# Patient Record
Sex: MALE | Race: WHITE
[De-identification: names, ages, dates, MRNs, and addresses within clinical notes are randomized per-mention and may not be internally consistent; named-entity substitution may affect disease eponyms.]

---

## 2017-03-03 ENCOUNTER — HOSPITAL ENCOUNTER (OUTPATIENT)
Age: 62
End: 2017-03-03
Payer: COMMERCIAL

## 2017-03-03 DIAGNOSIS — I10: ICD-10-CM

## 2017-03-03 DIAGNOSIS — E78.2: ICD-10-CM

## 2017-03-03 DIAGNOSIS — E66.3: ICD-10-CM

## 2017-03-03 DIAGNOSIS — E11.65: Primary | ICD-10-CM

## 2017-03-03 DIAGNOSIS — R35.1: ICD-10-CM

## 2017-03-03 LAB — BUN/CREATININE RATIO: 43 (ref 0–10)

## 2017-03-27 ENCOUNTER — HOSPITAL ENCOUNTER (INPATIENT)
Dept: HOSPITAL 79 - ER1 | Age: 62
LOS: 3 days | Discharge: HOME | DRG: 683 | End: 2017-03-30
Attending: CLINIC/CENTER | Admitting: CLINIC/CENTER
Payer: COMMERCIAL

## 2017-03-27 DIAGNOSIS — Z79.4: ICD-10-CM

## 2017-03-27 DIAGNOSIS — Z28.21: ICD-10-CM

## 2017-03-27 DIAGNOSIS — E78.5: ICD-10-CM

## 2017-03-27 DIAGNOSIS — B34.9: ICD-10-CM

## 2017-03-27 DIAGNOSIS — Z79.899: ICD-10-CM

## 2017-03-27 DIAGNOSIS — R07.89: ICD-10-CM

## 2017-03-27 DIAGNOSIS — Z87.442: ICD-10-CM

## 2017-03-27 DIAGNOSIS — R65.10: ICD-10-CM

## 2017-03-27 DIAGNOSIS — E87.2: ICD-10-CM

## 2017-03-27 DIAGNOSIS — Z82.49: ICD-10-CM

## 2017-03-27 DIAGNOSIS — Z79.82: ICD-10-CM

## 2017-03-27 DIAGNOSIS — R13.10: ICD-10-CM

## 2017-03-27 DIAGNOSIS — M51.36: ICD-10-CM

## 2017-03-27 DIAGNOSIS — I10: ICD-10-CM

## 2017-03-27 DIAGNOSIS — E86.0: ICD-10-CM

## 2017-03-27 DIAGNOSIS — K57.90: ICD-10-CM

## 2017-03-27 DIAGNOSIS — E11.65: ICD-10-CM

## 2017-03-27 DIAGNOSIS — Z84.89: ICD-10-CM

## 2017-03-27 DIAGNOSIS — N17.9: Primary | ICD-10-CM

## 2017-03-27 DIAGNOSIS — K52.9: ICD-10-CM

## 2017-03-27 LAB
BUN/CREATININE RATIO: 30 (ref 0–10)
HGB BLD-MCNC: 17.2 GM/DL (ref 14–17.5)
RED BLOOD COUNT: 5.56 M/UL (ref 4.2–5.5)
WHITE BLOOD COUNT: 17.3 K/UL (ref 4.5–11)

## 2017-03-27 PROCEDURE — C9113 INJ PANTOPRAZOLE SODIUM, VIA: HCPCS

## 2017-03-28 LAB
BUN/CREATININE RATIO: 35 (ref 0–10)
HGB BLD-MCNC: 16 GM/DL (ref 14–17.5)
RED BLOOD COUNT: 5.22 M/UL (ref 4.2–5.5)
WHITE BLOOD COUNT: 16.2 K/UL (ref 4.5–11)

## 2017-03-29 LAB
BUN/CREATININE RATIO: 33 (ref 0–10)
HGB BLD-MCNC: 15.5 GM/DL (ref 14–17.5)
RED BLOOD COUNT: 5.18 M/UL (ref 4.2–5.5)
WHITE BLOOD COUNT: 10.2 K/UL (ref 4.5–11)

## 2017-03-30 LAB
BUN/CREATININE RATIO: 34 (ref 0–10)
HGB BLD-MCNC: 15.3 GM/DL (ref 14–17.5)
RED BLOOD COUNT: 5.01 M/UL (ref 4.2–5.5)
WHITE BLOOD COUNT: 8.1 K/UL (ref 4.5–11)

## 2017-04-05 ENCOUNTER — HOSPITAL ENCOUNTER (OUTPATIENT)
Dept: HOSPITAL 79 - LAB | Age: 62
End: 2017-04-05
Attending: CLINIC/CENTER
Payer: COMMERCIAL

## 2017-04-05 DIAGNOSIS — N17.9: Primary | ICD-10-CM

## 2017-04-05 LAB — BUN/CREATININE RATIO: 33 (ref 0–10)

## 2017-04-10 ENCOUNTER — HOSPITAL ENCOUNTER (OUTPATIENT)
Dept: HOSPITAL 79 - LAB | Age: 62
End: 2017-04-10
Attending: CLINIC/CENTER
Payer: COMMERCIAL

## 2017-04-10 DIAGNOSIS — E04.1: ICD-10-CM

## 2017-04-10 DIAGNOSIS — N17.8: Primary | ICD-10-CM

## 2017-04-10 DIAGNOSIS — E86.0: ICD-10-CM

## 2017-05-02 ENCOUNTER — HOSPITAL ENCOUNTER (OUTPATIENT)
Dept: HOSPITAL 79 - LAB | Age: 62
End: 2017-05-02
Attending: CLINIC/CENTER
Payer: COMMERCIAL

## 2017-05-02 DIAGNOSIS — E04.1: ICD-10-CM

## 2017-05-02 DIAGNOSIS — N17.8: Primary | ICD-10-CM

## 2017-05-02 DIAGNOSIS — E78.2: ICD-10-CM

## 2017-05-02 DIAGNOSIS — E11.69: ICD-10-CM

## 2017-05-02 DIAGNOSIS — I10: ICD-10-CM

## 2017-05-02 LAB
HGB BLD-MCNC: 15.2 GM/DL (ref 14–17.5)
RED BLOOD COUNT: 5.05 M/UL (ref 4.2–5.5)
WHITE BLOOD COUNT: 5.9 K/UL (ref 4.5–11)

## 2017-05-23 ENCOUNTER — HOSPITAL ENCOUNTER (OUTPATIENT)
Dept: HOSPITAL 79 - US | Age: 62
End: 2017-05-23
Attending: CLINIC/CENTER
Payer: COMMERCIAL

## 2017-05-23 DIAGNOSIS — E04.1: Primary | ICD-10-CM

## 2024-09-24 ENCOUNTER — TELEPHONE (OUTPATIENT)
Age: 69
End: 2024-09-24

## 2024-09-24 NOTE — TELEPHONE ENCOUNTER
"HUB - OK to schedule \"     Your appointment with Pilo Graham PA-C on 10/22/2024 has been canceled as Pilo Graham will be out of the office that day. We have been unable to contact you to get this appointment rescheduled. Please call [phone] and we will get your appointment rescheduled at your convenience. Thank you and have a great day.   "

## 2025-01-16 ENCOUNTER — OFFICE VISIT (OUTPATIENT)
Dept: UROLOGY | Facility: CLINIC | Age: 70
End: 2025-01-16
Payer: MEDICARE

## 2025-01-16 VITALS — WEIGHT: 190 LBS | HEIGHT: 68 IN | BODY MASS INDEX: 28.79 KG/M2

## 2025-01-16 DIAGNOSIS — N52.9 ERECTILE DYSFUNCTION, UNSPECIFIED ERECTILE DYSFUNCTION TYPE: Primary | ICD-10-CM

## 2025-01-16 DIAGNOSIS — R39.9 LOWER URINARY TRACT SYMPTOMS: ICD-10-CM

## 2025-01-16 RX ORDER — EZETIMIBE AND SIMVASTATIN 10; 20 MG/1; MG/1
1 TABLET ORAL NIGHTLY
COMMUNITY
Start: 2024-12-10

## 2025-01-16 RX ORDER — AMLODIPINE BESYLATE 5 MG/1
10 TABLET ORAL DAILY
COMMUNITY
Start: 2024-09-11

## 2025-01-16 RX ORDER — INSULIN GLARGINE 100 [IU]/ML
INJECTION, SOLUTION SUBCUTANEOUS DAILY
COMMUNITY
Start: 2024-12-02

## 2025-01-16 RX ORDER — TADALAFIL 20 MG/1
20 TABLET ORAL DAILY PRN
Qty: 30 TABLET | Refills: 3 | Status: SHIPPED | OUTPATIENT
Start: 2025-01-16

## 2025-01-16 RX ORDER — LISINOPRIL 40 MG/1
40 TABLET ORAL DAILY
COMMUNITY
Start: 2024-11-22

## 2025-01-16 RX ORDER — METHOCARBAMOL 500 MG/1
500 TABLET, FILM COATED ORAL 3 TIMES DAILY
COMMUNITY
Start: 2024-12-04

## 2025-01-16 RX ORDER — CARBIDOPA AND LEVODOPA 25; 100 MG/1; MG/1
1 TABLET ORAL 4 TIMES DAILY
COMMUNITY
Start: 2024-12-12

## 2025-01-16 RX ORDER — LEVOTHYROXINE SODIUM 137 UG/1
137 TABLET ORAL DAILY
COMMUNITY

## 2025-01-16 RX ORDER — INSULIN ASPART 100 [IU]/ML
INJECTION, SOLUTION INTRAVENOUS; SUBCUTANEOUS
COMMUNITY
Start: 2024-09-26

## 2025-01-16 NOTE — PROGRESS NOTES
Erectile Dysfunction Office Visit      Patient Name: Ian Lane  : 1955   MRN: [id number]     Chief Complaint:  Erectile Dysfunction.   Chief Complaint   Patient presents with    Erectile Dysfunction    Nocturia    Urinary Urgency       Referring Provider: Humberto Alvarado APRN    History of Present Illness: Ian Lane is a 69 y.o. male with history of Type 2 Diabetes and Parkinson's Disease who presents today with history of erectile dysfunction and lower urinary tract symptoms.  He reports urinary frequency every 3 hours and nocturia x 3.  He reports moderate to strong urinary stream.  He denies urinary straining.  Never tried a medication for lower urinary tract symptoms.  He reports worsening erectile dysfunction over the last 5 years.  He has previously failed max dose of Viagra 100 mg and reports he has even taken 150 mg of Viagra at home without ability to obtain or maintain erections.  He does have type 2 diabetes and his last hemoglobin A1c was 7.5.  He is a non-smoker.    PSA 10/28/24; 2.3.   IPSS 8.   Subjective      Review of System: Review of Systems   Genitourinary:  Positive for frequency.        Nocturia. Erectile dysfunction.    All other systems reviewed and are negative.     I have reviewed the ROS documented by my clinical staff, I have updated appropriately and I agree. JORDAN Dallas    Past Medical History:   Past Medical History:   Diagnosis Date    Diabetes mellitus        Past Surgical History:   Past Surgical History:   Procedure Laterality Date    BACK SURGERY  1982    RETINAL DETACHMENT SURGERY      THYROIDECTOMY         Family History: History reviewed. No pertinent family history.    Social History:   Social History     Socioeconomic History    Marital status:    Tobacco Use    Smoking status: Never    Smokeless tobacco: Never   Vaping Use    Vaping status: Never Used   Substance and Sexual Activity    Alcohol use: Yes     Comment:  occasionally    Drug use: Never    Sexual activity: Yes     Partners: Female       Medications:     Current Outpatient Medications:     amLODIPine (NORVASC) 5 MG tablet, Take 2 tablets by mouth Daily., Disp: , Rfl:     carbidopa-levodopa (SINEMET)  MG per tablet, Take 1 tablet by mouth 4 (Four) Times a Day., Disp: , Rfl:     ezetimibe-simvastatin (VYTORIN) 10-20 MG per tablet, Take 1 tablet by mouth Every Night., Disp: , Rfl:     Lantus SoloStar 100 UNIT/ML injection pen, Inject  under the skin into the appropriate area as directed Daily., Disp: , Rfl:     levothyroxine (SYNTHROID, LEVOTHROID) 137 MCG tablet, Take 1 tablet by mouth Daily., Disp: , Rfl:     lisinopril (PRINIVIL,ZESTRIL) 40 MG tablet, Take 1 tablet by mouth Daily., Disp: , Rfl:     methocarbamol (ROBAXIN) 500 MG tablet, Take 1 tablet by mouth 3 (Three) Times a Day., Disp: , Rfl:     NovoLOG FlexPen 100 UNIT/ML solution pen-injector sc pen, Inject  under the skin into the appropriate area as directed 3 (Three) Times a Day With Meals., Disp: , Rfl:     tadalafil (Cialis) 20 MG tablet, Take 1 tablet by mouth Daily As Needed for Erectile Dysfunction., Disp: 30 tablet, Rfl: 3    Allergies:   No Known Allergies    IPSS Questionnaire (AUA-7):  Over the past month…    1)  Incomplete Emptying:       How often have you had a sensation of not emptying you had the sensation of not emptying your bladder completely after you finished urinating?  1 - Less than 1 time in 5   2)  Frequency:       How often have you had the urinate again less than two hours after you finished urinating?  2 - Less than half the time   3)  Intermittency:       How often have you found you stopped and started again several times when you urinated?   0 - Not at all   4) Urgency:      How often have you found it difficult to postpone urination?  1 - Less than 1 time in 5   5) Weak Stream:      How often have you had a weak urinary stream?  1 - Less than 1 time in 5   6) Straining:      "  How often have you had to push or strain to begin urination?  0 - Not at all   7) Nocturia:      How many times did you most typically get up to urinate from the time you went to bed at night until the time you got up in the morning?  3 - 3 times   Total Score:  8   The International Prostate Symptom Score (IPSS) is used to screen, diagnose, track symptoms of benign prostatic hyperplasia (BPH).   0-7 (Mild Symptoms) 8-19 (Moderate) 20-35 (Severe)   Quality of Life (QoL):  If you were to spend the rest of your life with your urinary condition just the way it is now, how would you feel about that? 2-Mostly Satisfied   Urine Leakage (Incontinence) 0-No Leakage         Objective     Physical Exam:   Vital Signs:   Vitals:    01/16/25 1005   Weight: 86.2 kg (190 lb)   Height: 172.7 cm (68\")     Body mass index is 28.89 kg/m².     Physical Exam  Vitals and nursing note reviewed.   Constitutional:       Appearance: Normal appearance.   HENT:      Head: Normocephalic and atraumatic.      Nose: Nose normal.      Mouth/Throat:      Mouth: Mucous membranes are moist.   Eyes:      Pupils: Pupils are equal, round, and reactive to light.   Pulmonary:      Effort: Pulmonary effort is normal.   Abdominal:      General: Abdomen is flat.      Palpations: Abdomen is soft.   Musculoskeletal:         General: Normal range of motion.      Cervical back: Normal range of motion.   Skin:     General: Skin is warm and dry.      Capillary Refill: Capillary refill takes less than 2 seconds.   Neurological:      General: No focal deficit present.      Mental Status: He is alert.   Psychiatric:         Mood and Affect: Mood normal.       Labs:   No results found for: \"TESTOSTERONE\", \"PROLACTIN\", \"FSH\", \"LH\", \"HCT\"    No results found for: \"PSA\"    Images:   No Images in the past 120 days found..    Measures:   Tobacco:   Ian Lane  reports that he has never smoked. He has never used smokeless tobacco.         Urine Incontinence: Patient " reports that he is not currently experiencing any symptoms of urinary incontinence.    Assessment / Plan      Assessment/Plan:   Mr. Lane is a 69 y.o. male with erectile dysfunction and lower urinary tract symptoms.     We discussed proceeding with intracavernosal injections for erectile dysfunction.  He reports he would like to try Cialis first.  Prescription sent for Cialis 20 mg as needed for erectile dysfunction.  He will consider intracavernosal injections in the future if he fails Cialis.  We also discussed lower urinary tract symptoms and trial of beta 3 agonist versus anatomical evaluation.  He reports he is not overly bothered by his urinary symptoms but he might consider a further workup or medication trial in the future.  We discussed that his lower urinary tract symptoms are likely worsened by his Parkinson's disease.  He will follow-up in 3 months for symptom check.    Diagnoses and all orders for this visit:    1. Erectile dysfunction, unspecified erectile dysfunction type (Primary)  -     tadalafil (Cialis) 20 MG tablet; Take 1 tablet by mouth Daily As Needed for Erectile Dysfunction.  Dispense: 30 tablet; Refill: 3    2. Lower urinary tract symptoms         Patient Education:   1.   I discussed treatment options including oral PDE5- medication,  pharmacologic injections, vacuum erection devices and implantable penile prostheses.    2. The patient was educated about Cialis.  He was counseled on appropriate use, timing and the need for sexual stimulation.  In addition, he understands not to use this medication with nitrates. He was instructed to take the medication about 1 hour prior to sexual activity.  Side effects were explained to the patient such as headache, visual changes, upset stomach, and back pain. Lastly, he was instructed to go immediately to his nearest emergency room in the event he developed an erection lasting longer than 3 hours. He voiced understanding of all these instructions and  the importance of seeking prompt medical attention for an erection lasting longer than 3 hours.     Follow Up:   Return in about 3 months (around 4/16/2025).    I spent approximately 20 minutes providing clinical care for this patient; including review of patient's chart and provider documentation, face to face time spent with patient in examination room (obtaining history, performing physical exam, discussing diagnosis and management options), placing orders, and completing patient documentation.     JORDAN Kelly   [facility] [place]

## 2025-04-16 ENCOUNTER — OFFICE VISIT (OUTPATIENT)
Dept: UROLOGY | Facility: CLINIC | Age: 70
End: 2025-04-16
Payer: MEDICARE

## 2025-04-16 VITALS — SYSTOLIC BLOOD PRESSURE: 160 MMHG | OXYGEN SATURATION: 97 % | DIASTOLIC BLOOD PRESSURE: 86 MMHG | HEART RATE: 68 BPM

## 2025-04-16 DIAGNOSIS — R39.9 LOWER URINARY TRACT SYMPTOMS: ICD-10-CM

## 2025-04-16 DIAGNOSIS — N52.9 ERECTILE DYSFUNCTION, UNSPECIFIED ERECTILE DYSFUNCTION TYPE: Primary | ICD-10-CM

## 2025-04-16 PROCEDURE — 1159F MED LIST DOCD IN RCRD: CPT | Performed by: NURSE PRACTITIONER

## 2025-04-16 PROCEDURE — 1160F RVW MEDS BY RX/DR IN RCRD: CPT | Performed by: NURSE PRACTITIONER

## 2025-04-16 PROCEDURE — 99213 OFFICE O/P EST LOW 20 MIN: CPT | Performed by: NURSE PRACTITIONER

## 2025-04-16 NOTE — PROGRESS NOTES
Erectile Dysfunction Office Visit      Patient Name: Ian Lane  : 1955   MRN: [id number]     Chief Complaint:  Erectile Dysfunction.   Chief Complaint   Patient presents with    Erectile Dysfunction    LUTS       Referring Provider: No ref. provider found    History of Present Illness: Ian Lane is a 69 y.o. male who presents today with history of erectile dysfunction and lower urinary tract symptoms.     He reports occasional urinary urgency.  Is not overly bothered by his urinary symptoms.  IPSS 6.     Previously failed Viagra and has been trying to take Cialis 20 mg as needed for erectile dysfunction.  He reports he is able to obtain a partial erection several hours after taking Cialis.  Subjective      Review of System: Review of Systems   Genitourinary:  Positive for urgency.        Erectile dysfunction   All other systems reviewed and are negative.     I have reviewed the ROS documented by my clinical staff, I have updated appropriately and I agree. JORDAN Dallas    Past Medical History:   Past Medical History:   Diagnosis Date    Diabetes mellitus     Parkinson's disease     Thyroid cancer        Past Surgical History:   Past Surgical History:   Procedure Laterality Date    BACK SURGERY      RETINAL DETACHMENT SURGERY      THYROIDECTOMY         Family History:   Family History   Problem Relation Age of Onset    Prostate cancer Neg Hx     Testicular cancer Neg Hx     Kidney cancer Neg Hx         bladder cancer       Social History:   Social History     Socioeconomic History    Marital status:    Tobacco Use    Smoking status: Never    Smokeless tobacco: Never   Vaping Use    Vaping status: Never Used   Substance and Sexual Activity    Alcohol use: Yes     Comment: occasionally    Drug use: Never    Sexual activity: Yes     Partners: Female       Medications:     Current Outpatient Medications:     amLODIPine (NORVASC) 5 MG tablet, Take 2 tablets by mouth  Daily., Disp: , Rfl:     carbidopa-levodopa (SINEMET)  MG per tablet, Take 1 tablet by mouth 4 (Four) Times a Day., Disp: , Rfl:     ezetimibe-simvastatin (VYTORIN) 10-20 MG per tablet, Take 1 tablet by mouth Every Night., Disp: , Rfl:     Insulin Pen Needle 31G X 8 MM misc, use 1 unit with insulin pens for 90 days E11.65, Disp: , Rfl:     Lantus SoloStar 100 UNIT/ML injection pen, Inject  under the skin into the appropriate area as directed Daily., Disp: , Rfl:     levothyroxine (SYNTHROID, LEVOTHROID) 137 MCG tablet, Take 1 tablet by mouth Daily., Disp: , Rfl:     lisinopril (PRINIVIL,ZESTRIL) 40 MG tablet, Take 1 tablet by mouth Daily., Disp: , Rfl:     methocarbamol (ROBAXIN) 500 MG tablet, Take 1 tablet by mouth 3 (Three) Times a Day., Disp: , Rfl:     NovoLOG FlexPen 100 UNIT/ML solution pen-injector sc pen, Inject  under the skin into the appropriate area as directed 3 (Three) Times a Day With Meals., Disp: , Rfl:     tadalafil (Cialis) 20 MG tablet, Take 1 tablet by mouth Daily As Needed for Erectile Dysfunction. (Patient not taking: Reported on 4/16/2025), Disp: 30 tablet, Rfl: 3    Allergies:   No Known Allergies    IPSS Questionnaire (AUA-7):  Over the past month…    1)  Incomplete Emptying:       How often have you had a sensation of not emptying you had the sensation of not emptying your bladder completely after you finished urinating?  0 - Not at all   2)  Frequency:       How often have you had the urinate again less than two hours after you finished urinating?  2 - Less than half the time   3)  Intermittency:       How often have you found you stopped and started again several times when you urinated?   0 - Not at all   4) Urgency:      How often have you found it difficult to postpone urination?  2 - Less than half the time   5) Weak Stream:      How often have you had a weak urinary stream?  0 - Not at all   6) Straining:       How often have you had to push or strain to begin urination?  0 -  "Not at all   7) Nocturia:      How many times did you most typically get up to urinate from the time you went to bed at night until the time you got up in the morning?  2 - 2 times   Total Score:  6   The International Prostate Symptom Score (IPSS) is used to screen, diagnose, track symptoms of benign prostatic hyperplasia (BPH).   0-7 (Mild Symptoms) 8-19 (Moderate) 20-35 (Severe)   Quality of Life (QoL):  If you were to spend the rest of your life with your urinary condition just the way it is now, how would you feel about that? 2-Mostly Satisfied   Urine Leakage (Incontinence) 0-No Leakage       Objective     Physical Exam:   Vital Signs:   Vitals:    04/16/25 1330 04/16/25 1412   BP: (!) 181/79 160/86   Pulse: 68    SpO2: 97%      There is no height or weight on file to calculate BMI.     Physical Exam  Vitals and nursing note reviewed.   Constitutional:       Appearance: Normal appearance.   HENT:      Head: Normocephalic and atraumatic.      Nose: Nose normal.      Mouth/Throat:      Mouth: Mucous membranes are moist.   Eyes:      Pupils: Pupils are equal, round, and reactive to light.   Pulmonary:      Effort: Pulmonary effort is normal.   Abdominal:      General: Abdomen is flat.      Palpations: Abdomen is soft.   Musculoskeletal:         General: Normal range of motion.      Cervical back: Normal range of motion.   Skin:     General: Skin is warm and dry.      Capillary Refill: Capillary refill takes less than 2 seconds.   Neurological:      General: No focal deficit present.      Mental Status: He is alert.   Psychiatric:         Mood and Affect: Mood normal.       Labs:   No results found for: \"TESTOSTERONE\", \"PROLACTIN\", \"FSH\", \"LH\", \"HCT\"    No results found for: \"PSA\"    Images:   No Images in the past 120 days found..    Measures:   Tobacco:   Ian Lane  reports that he has never smoked. He has never used smokeless tobacco.          Urine Incontinence: Patient reports that he is not currently " experiencing any symptoms of urinary incontinence.   Assessment / Plan      Assessment/Plan:   Mr. Lane is a 69 y.o. male with erectile dysfunction and lower urinary tract symptoms.  He is not overly bothered by his urinary symptoms and defers medication at this time.  He has previously tried and failed Viagra and Cialis.  We discussed proceeding with intracavernosal injections and he would like to proceed.  We will fax order for Trimix and he will follow-up in 2 to 3 weeks for Trimix injection teaching.  Patient was provided educational material regarding Trimix injections to take home.    Diagnoses and all orders for this visit:    1. Erectile dysfunction, unspecified erectile dysfunction type (Primary)    2. Lower urinary tract symptoms           Follow Up:   Return for 2-3 weeks Trimix Injection teaching.    I spent approximately 15 minutes providing clinical care for this patient; including review of patient's chart and provider documentation, face to face time spent with patient in examination room (obtaining history, performing physical exam, discussing diagnosis and management options), placing orders, and completing patient documentation.     JORDAN Kelly   [facility] [place]

## 2025-04-25 ENCOUNTER — TELEPHONE (OUTPATIENT)
Dept: UROLOGY | Facility: CLINIC | Age: 70
End: 2025-04-25

## 2025-04-25 NOTE — TELEPHONE ENCOUNTER
Caller: RAYMOND ROBERT    Relationship: SELF    Best call back number: [phone] (home)      Who are you requesting to speak with (clinical staff, provider,  specific staff member): YOVANI    Do you know the name of the person who called: PT    What was the call regarding: PT CALLING TO LET YOVANI KNOW THAT HIS WIFE IS A NURSE AND THEY COMPLETED THE TRIMIX TRIAL AT HOME AND STATES IT WORKS FINE.